# Patient Record
Sex: FEMALE | Race: BLACK OR AFRICAN AMERICAN | URBAN - METROPOLITAN AREA
[De-identification: names, ages, dates, MRNs, and addresses within clinical notes are randomized per-mention and may not be internally consistent; named-entity substitution may affect disease eponyms.]

---

## 2019-01-31 ENCOUNTER — TELEPHONE (OUTPATIENT)
Dept: ONCOLOGY | Age: 34
End: 2019-01-31

## 2019-01-31 NOTE — TELEPHONE ENCOUNTER
Patient stated she is going to sign the release form for her records first thing tomorrow morning and can have records faxed over to our office. Made patient aware that if we can not obtain records by tomorrow afternoon patient will have to reschedule appointment. Patient stated she will take care of this matter first thing in the morning.

## 2019-02-04 NOTE — PROGRESS NOTES
[address], [address]  [address], [address] [facility] 
[address] [facility], [address] [place], [address] 
 [phone] Ninfa Boyer DO Patient ID: 
Name:  Serge Alcantar MRN:  [id number] :  1985/34 y.o. Date:  2019 HISTORY OF PRESENT ILLNESS: 
Serge Alcantar is a 27 y.o.   premenopausal female self referral for persistent HPV infection. Seen at [facility] for colpo and biopsy with LSIL in 2017. Currently taking DMPA 150 mg/ml with most recent dose 18. Has some breakthrough bleeding. Denies pelvic pain. Labs: 
18 Pap IG, HPV-hr:  
NEGATIVE FOR INTRAEPITHELIAL LESION AND MALIGNANCY, HPV POSITIVE.  
 
17 Pap IG, HPV-hr: LSIL, HPV POSITIVE. Imaging 
none ROS:  
As above Patient Active Problem List  
 Diagnosis Date Noted  Obesity, morbid ([address] 2019 Past Medical History:  
Diagnosis Date  Asthma  Generalized headaches  LGSIL on Pap smear of cervix Past Surgical History:  
Procedure Laterality Date  COLPOSCOPY  2017  HX  SECTION  2006  HX TONSILLECTOMY   OB History  Para Term  AB Living 1 1 SAB TAB Ectopic Molar Multiple Live Births 1  
  
 
Social History Tobacco Use  Smoking status: Never Smoker  Smokeless tobacco: Never Used Substance Use Topics  Alcohol use: Yes Comment: occasionally Family History Problem Relation Age of Onset  Thyroid Disease Mother  Hypertension Mother  Ovarian Cancer Maternal [employer] Current Outpatient Medications Medication Sig  
 medroxyPROGESTERone (DEPO-PROVERA) 150 mg/mL injection 150 mg. No current facility-administered medications for this visit. Allergies Allergen Reactions  Egg Hives OBJECTIVE: 
 
Physical Exam 
VITAL SIGNS: Visit Vitals /86 (BP 1 Location: Left arm, BP Patient Position: Sitting) Pulse 89 Temp 99.1 °F (37.3 °C) (Oral) Resp 18 Ht 5' 5\" (1.651 m) Wt 113.9 kg (251 lb) SpO2 97% BMI 41.77 kg/m² GENERAL ELE: in no apparent distress and well developed and well nourished MUSCULOSKEL: no joint tenderness, deformity or swelling INTEGUMENT:  warm and dry, no rashes or lesions ABDOMEN . soft, NT, ND, No masses appreciated EXTREMITIES: extremities normal, atraumatic, no cyanosis or edema PELVIC: Vulva and vagina appear normal. Bimanual exam reveals normal uterus and adnexa. RECTAL: deferred JONG SURVEY: Cervical, supraclavicular, axillary and inguinal nodes normal.  
NEURO: Grossly normal  
 
 
 
IMPRESSION/PLAN: 
1. Persistent HPV infection 
 -reviewed natural history of HPV infection and explained that HPV can not be eradicated  
 -will repeat pap smear today with genotyping of HPV and call with results 
 -if HPV 16/18 +  Or abnormal cytology would recommend colposcopy  
 -all of ms. Chan's questions/concerns addressed The total time spent was 60 minutes regarding this patients diagnosis of HPV infection and >50% of this time was spent counseling and coordinating care Rachel Llamas DO Gynecologic Oncology 2/13/201910:08 AM

## 2019-02-13 ENCOUNTER — OFFICE VISIT (OUTPATIENT)
Dept: ONCOLOGY | Age: 34
End: 2019-02-13

## 2019-02-13 VITALS
BODY MASS INDEX: 41.82 KG/M2 | DIASTOLIC BLOOD PRESSURE: 86 MMHG | TEMPERATURE: 99.1 F | SYSTOLIC BLOOD PRESSURE: 140 MMHG | RESPIRATION RATE: 18 BRPM | HEIGHT: 65 IN | HEART RATE: 89 BPM | OXYGEN SATURATION: 97 % | WEIGHT: 251 LBS

## 2019-02-13 DIAGNOSIS — B97.7 HIGH RISK HPV INFECTION: Primary | ICD-10-CM

## 2019-02-13 PROBLEM — E66.01 OBESITY, MORBID (HCC): Status: ACTIVE | Noted: 2019-02-13

## 2019-02-13 RX ORDER — MEDROXYPROGESTERONE ACETATE 150 MG/ML
150 INJECTION, SUSPENSION INTRAMUSCULAR
COMMUNITY

## 2019-02-13 NOTE — PROGRESS NOTES
Freddie Farfan, a 27 y.o. female,  is here for Chief Complaint Patient presents with  New Patient  
  second opinion ovarian cancer There were no vitals taken for this visit. Patient reports sharp cramp pain in her pelvic vaginal area, sometimes hurts to sit. Takes Motrin as needed. Denies any unusual vaginal bleeding, discharge, irritation, or odor. Denies experiencing any constipation or diarrhea. No burning, discomfort, or irritation with urination, has frequent urination.

## 2019-02-15 NOTE — PATIENT INSTRUCTIONS
Human Papillomavirus (HPV): Care Instructions Your Care Instructions The human papillomavirus (HPV) is a very common virus. There are many types of HPV. Some types cause the common skin wart. Other types cause genital warts, which can be spread by sexual contact. Some types can increase the risk for cervical and anal cancer. Having one type of HPV does not lead to having another type. Many women who have HPV may not know that they are infected until it is found with a Pap test. Your doctor uses this test to look for abnormal cells on your cervix. If you have had an abnormal Pap test, your doctor may recommend that you have an HPV test. 
Like a Pap test, an HPV test is done on a sample of cells collected from the cervix. If the test finds that you have the types of HPV that might lead to cancer, your doctor may suggest more tests. This does not mean that you will develop cancer; it means that you may have an increased risk. Abnormal cell changes caused by HPV often go away on their own. If the changes do not go away, they can be treated. But because HPV can stay inside the body, the abnormal cervical cells sometimes come back. This is why it is important to follow up with your doctor and have regular Pap tests. Follow-up care is a key part of your treatment and safety. Be sure to make and go to all appointments, and call your doctor if you are having problems. It's also a good idea to know your test results and keep a list of the medicines you take. How can you care for yourself at home? · If you are going to have a Pap or HPV test, do not douche or use tampons or vaginal creams in the 24 hours before the test. 
· Do not smoke. Smoking increases the risk for cervical problems and abnormal Pap tests. If you need help quitting, talk to your doctor about stop-smoking programs and medicines. These can increase your chances of quitting for good. · Use latex condoms every time you have sex. Use them from the beginning to the end of sexual contact. · Be sure to tell your sexual partner or partners that you have HPV. Even if you do not have symptoms, you can still pass HPV to others. · Having one sex partner (who does not have STIs and does not have sex with anyone else) is a good way to avoid STIs. When should you call for help? Watch closely for changes in your health, and be sure to contact your doctor if: 
  · You have vaginal pain during or after sex.  
  · You have vaginal bleeding when you are not in your menstrual period. Where can you learn more? Go to http://meron-jelena.info/. Enter F690 in the search box to learn more about \"Human Papillomavirus (HPV): Care Instructions. \" Current as of: September 11, 2018 Content Version: 11.9 © 4325-0375 [employer], [employer]. Care instructions adapted under license by [employer] (which disclaims liability or warranty for this information). If you have questions about a medical condition or this instruction, always ask your healthcare professional. [address] any warranty or liability for your use of this information.

## 2019-02-19 LAB
CYTOLOGIST CVX/VAG CYTO: ABNORMAL
CYTOLOGY CVX/VAG DOC THIN PREP: ABNORMAL
DX ICD CODE: ABNORMAL
HPV GENOTYPE 18,45: NEGATIVE
HPV I/H RISK 4 DNA CVX QL PROBE+SIG AMP: POSITIVE
HPV16 DNA CVX QL PROBE+SIG AMP: NEGATIVE
Lab: ABNORMAL
OTHER STN SPEC: ABNORMAL
PATH REPORT.FINAL DX SPEC: ABNORMAL
STAT OF ADQ CVX/VAG CYTO-IMP: ABNORMAL

## 2019-02-21 ENCOUNTER — TELEPHONE (OUTPATIENT)
Dept: ONCOLOGY | Age: 34
End: 2019-02-21

## 2019-02-21 NOTE — TELEPHONE ENCOUNTER
Informed patient that her recent pap test was satisfactory and NIL, but positive for HPV. Further explained that she was negative for HPV 16/18/45 which are the HPV strands associated with cervical cancer. Explained to pt that based on this information no intervention is necessary at this time and she needs a repeat pap in 1 year. Patient verbalized understanding. Patient agreed to plan. Patient instructed to contact office for any question or concerns.